# Patient Record
Sex: FEMALE | Race: WHITE | NOT HISPANIC OR LATINO
[De-identification: names, ages, dates, MRNs, and addresses within clinical notes are randomized per-mention and may not be internally consistent; named-entity substitution may affect disease eponyms.]

---

## 2017-01-20 ENCOUNTER — APPOINTMENT (OUTPATIENT)
Dept: UROGYNECOLOGY | Facility: CLINIC | Age: 72
End: 2017-01-20

## 2017-01-20 VITALS
DIASTOLIC BLOOD PRESSURE: 78 MMHG | RESPIRATION RATE: 16 BRPM | SYSTOLIC BLOOD PRESSURE: 148 MMHG | TEMPERATURE: 98.5 F | HEART RATE: 76 BPM

## 2017-01-20 RX ORDER — CEFUROXIME AXETIL 500 MG/1
500 TABLET ORAL
Qty: 20 | Refills: 0 | Status: DISCONTINUED | COMMUNITY
Start: 2016-11-23

## 2017-01-20 RX ORDER — AZITHROMYCIN 250 MG/1
250 TABLET, FILM COATED ORAL
Qty: 6 | Refills: 0 | Status: DISCONTINUED | COMMUNITY
Start: 2017-01-08

## 2017-01-20 RX ORDER — RISEDRONATE SODIUM 150 MG/1
150 TABLET, FILM COATED ORAL
Qty: 1 | Refills: 0 | Status: ACTIVE | COMMUNITY
Start: 2016-10-09

## 2017-01-20 RX ORDER — CANDESARTAN CILEXETIL AND HYDROCHLOROTHIAZIDE 32; 12.5 MG/1; MG/1
32-12.5 TABLET ORAL
Qty: 30 | Refills: 0 | Status: DISCONTINUED | COMMUNITY
Start: 2016-10-20

## 2017-01-30 ENCOUNTER — APPOINTMENT (OUTPATIENT)
Dept: UROGYNECOLOGY | Facility: CLINIC | Age: 72
End: 2017-01-30

## 2017-02-06 ENCOUNTER — APPOINTMENT (OUTPATIENT)
Dept: UROGYNECOLOGY | Facility: CLINIC | Age: 72
End: 2017-02-06

## 2017-02-06 VITALS
SYSTOLIC BLOOD PRESSURE: 130 MMHG | DIASTOLIC BLOOD PRESSURE: 74 MMHG | HEART RATE: 71 BPM | TEMPERATURE: 98 F | RESPIRATION RATE: 16 BRPM

## 2017-02-27 ENCOUNTER — MESSAGE (OUTPATIENT)
Age: 72
End: 2017-02-27

## 2017-03-13 ENCOUNTER — APPOINTMENT (OUTPATIENT)
Dept: UROGYNECOLOGY | Facility: CLINIC | Age: 72
End: 2017-03-13

## 2017-04-19 ENCOUNTER — APPOINTMENT (OUTPATIENT)
Dept: UROGYNECOLOGY | Facility: CLINIC | Age: 72
End: 2017-04-19

## 2017-05-24 ENCOUNTER — APPOINTMENT (OUTPATIENT)
Dept: UROGYNECOLOGY | Facility: CLINIC | Age: 72
End: 2017-05-24

## 2017-05-24 DIAGNOSIS — N93.9 ABNORMAL UTERINE AND VAGINAL BLEEDING, UNSPECIFIED: ICD-10-CM

## 2017-06-28 ENCOUNTER — APPOINTMENT (OUTPATIENT)
Dept: UROGYNECOLOGY | Facility: CLINIC | Age: 72
End: 2017-06-28

## 2017-06-29 ENCOUNTER — MESSAGE (OUTPATIENT)
Age: 72
End: 2017-06-29

## 2017-06-30 ENCOUNTER — APPOINTMENT (OUTPATIENT)
Dept: UROGYNECOLOGY | Facility: CLINIC | Age: 72
End: 2017-06-30

## 2017-09-25 ENCOUNTER — APPOINTMENT (OUTPATIENT)
Dept: UROGYNECOLOGY | Facility: CLINIC | Age: 72
End: 2017-09-25

## 2017-11-24 ENCOUNTER — APPOINTMENT (OUTPATIENT)
Dept: UROGYNECOLOGY | Facility: CLINIC | Age: 72
End: 2017-11-24
Payer: COMMERCIAL

## 2017-11-24 PROCEDURE — 99213 OFFICE O/P EST LOW 20 MIN: CPT

## 2018-03-30 ENCOUNTER — APPOINTMENT (OUTPATIENT)
Dept: UROGYNECOLOGY | Facility: CLINIC | Age: 73
End: 2018-03-30
Payer: MEDICARE

## 2018-03-30 PROCEDURE — 99213 OFFICE O/P EST LOW 20 MIN: CPT

## 2018-08-27 ENCOUNTER — APPOINTMENT (OUTPATIENT)
Dept: UROGYNECOLOGY | Facility: CLINIC | Age: 73
End: 2018-08-27

## 2019-01-25 ENCOUNTER — APPOINTMENT (OUTPATIENT)
Dept: UROGYNECOLOGY | Facility: CLINIC | Age: 74
End: 2019-01-25
Payer: MEDICARE

## 2019-01-25 PROCEDURE — 99213 OFFICE O/P EST LOW 20 MIN: CPT

## 2019-01-25 NOTE — DISCUSSION/SUMMARY
[FreeTextEntry1] : F/U in 3 months for pelvic prolapse.  IF pt have any problems to call office.  PT agrees.

## 2019-01-25 NOTE — PHYSICAL EXAM
[No Acute Distress] : in no acute distress [Well Nourished] : ~L well nourished [Good Hygeine] : demonstrates good hygeine [Oriented x3] : oriented to person, place, and time [Normal Mood/Affect] : mood and affect are normal [Soft, Nontender] : the abdomen was soft and nontender [Warm and Dry] : was warm and dry to touch [Normal Gait] : gait was normal [Vulvar Atrophy] : vulvar atrophy [Atrophy] : atrophy [Cystocele] : a cystocele [Discharge] : a  ~M vaginal discharge was present [Scant] : scant [Santo] : yellow [No Bleeding] : there was no active vaginal bleeding

## 2019-01-25 NOTE — HISTORY OF PRESENT ILLNESS
[FreeTextEntry1] : PT haven't been to the office since last March 2018.  PT doing fine with pessary.  Denies any pain, discomfort, or vaginal bleeding.  Voiding and moving BM without problems.  Stated she had followed up with her GYN Dr. Aragon.  She is due for annual.

## 2019-01-25 NOTE — PROCEDURE
[Good Fit] : fits well [Discharge] : there is vaginal discharge [Pessary Inserted] : inserted [Pessary Washed] : washed [H2O] : H2O [None] : no bleeding [Erythema] : no erythema [Infection] : no evidence of infection [FreeTextEntry1] : Ring with support # 4 [FreeTextEntry8] : Reinforced daily pericare.

## 2020-04-01 ENCOUNTER — APPOINTMENT (OUTPATIENT)
Dept: UROGYNECOLOGY | Facility: CLINIC | Age: 75
End: 2020-04-01

## 2020-06-12 ENCOUNTER — APPOINTMENT (OUTPATIENT)
Dept: UROGYNECOLOGY | Facility: CLINIC | Age: 75
End: 2020-06-12
Payer: MEDICARE

## 2020-06-12 VITALS — TEMPERATURE: 97.8 F

## 2020-06-12 DIAGNOSIS — N39.46 MIXED INCONTINENCE: ICD-10-CM

## 2020-06-12 PROCEDURE — 99213 OFFICE O/P EST LOW 20 MIN: CPT

## 2020-06-15 NOTE — PROCEDURE
[Good Fit] : fits well [Discharge] : there is vaginal discharge [Pessary Inserted] : inserted [Pessary Washed] : washed [H2O] : H2O [None] : no bleeding [Infection] : no evidence of infection [Erythema] : no erythema [FreeTextEntry8] : Reinforced daily pericare. [FreeTextEntry1] : Ring with support # 4

## 2020-06-15 NOTE — HISTORY OF PRESENT ILLNESS
[FreeTextEntry1] : Pt haven't been to the office for 1 year.  She went to Dr. Aragon back in December and was supposed to come to the office April/May but d/t Covid-19, she delayed it.  She's been doing well.\par PT doing fine with pessary.  Denies any pain, discomfort, or vaginal bleeding.  Voiding and moving BM without problems.

## 2021-03-08 ENCOUNTER — APPOINTMENT (OUTPATIENT)
Dept: UROGYNECOLOGY | Facility: CLINIC | Age: 76
End: 2021-03-08
Payer: MEDICARE

## 2021-03-08 PROCEDURE — 99072 ADDL SUPL MATRL&STAF TM PHE: CPT

## 2021-03-08 PROCEDURE — 99213 OFFICE O/P EST LOW 20 MIN: CPT

## 2021-03-08 NOTE — DISCUSSION/SUMMARY
[FreeTextEntry1] : Pelvic Prolapse: \par -Continue with Ring with support # 4.\par -Pt aware of the importance of maintenance and follow ups.\par -Pessary precautions and instructions were reviewed.\par \par F/U in 3 months for pelvic prolapse.  IF pt have any problems to call office.  PT agrees.

## 2021-03-08 NOTE — PHYSICAL EXAM
[No Acute Distress] : in no acute distress [Well Nourished] : ~L well nourished [Good Hygeine] : demonstrates good hygeine [Oriented x3] : oriented to person, place, and time [Normal Mood/Affect] : mood and affect are normal [Soft, Nontender] : the abdomen was soft and nontender [Warm and Dry] : was warm and dry to touch [Normal Gait] : gait was normal [Vulvar Atrophy] : vulvar atrophy [Atrophy] : atrophy [Cystocele] : a cystocele [Discharge] : a  ~M vaginal discharge was present [Scant] : scant [Santo] : yellow [No Bleeding] : there was no active vaginal bleeding [de-identified] : Mild friable introitus.  Small bleed but stops with pressure.

## 2021-03-08 NOTE — HISTORY OF PRESENT ILLNESS
[FreeTextEntry1] : Pt haven't been to the office for 9 months.\par PT doing fine with pessary.  Denies any pain, discomfort, or vaginal bleeding.  Voiding and moving BM without problems.\par Pt have been caring for family and didn't have the time come to the office.  She is aware of the importance to come for maintenance.

## 2021-06-29 ENCOUNTER — APPOINTMENT (OUTPATIENT)
Dept: GASTROENTEROLOGY | Facility: CLINIC | Age: 76
End: 2021-06-29

## 2021-07-12 ENCOUNTER — APPOINTMENT (OUTPATIENT)
Dept: GASTROENTEROLOGY | Facility: CLINIC | Age: 76
End: 2021-07-12
Payer: MEDICARE

## 2021-07-12 VITALS
OXYGEN SATURATION: 98 % | HEIGHT: 62 IN | HEART RATE: 72 BPM | WEIGHT: 122.13 LBS | DIASTOLIC BLOOD PRESSURE: 60 MMHG | TEMPERATURE: 97.5 F | BODY MASS INDEX: 22.48 KG/M2 | SYSTOLIC BLOOD PRESSURE: 108 MMHG

## 2021-07-12 DIAGNOSIS — Z86.010 PERSONAL HISTORY OF COLONIC POLYPS: ICD-10-CM

## 2021-07-12 DIAGNOSIS — Z78.9 OTHER SPECIFIED HEALTH STATUS: ICD-10-CM

## 2021-07-12 DIAGNOSIS — K21.9 GASTRO-ESOPHAGEAL REFLUX DISEASE W/OUT ESOPHAGITIS: ICD-10-CM

## 2021-07-12 PROCEDURE — 99072 ADDL SUPL MATRL&STAF TM PHE: CPT

## 2021-07-12 PROCEDURE — 99213 OFFICE O/P EST LOW 20 MIN: CPT

## 2021-07-12 NOTE — ASSESSMENT
[FreeTextEntry1] : Impression: History of colon polyps due for follow-up surveillance colonoscopy.  We will schedule using MiraLAX prep.  Long history of GERD we will continue pantoprazole 20 mg at least q. OD with extra doses as needed

## 2021-07-12 NOTE — HISTORY OF PRESENT ILLNESS
[Heartburn] : stable heartburn [Nausea] : stable nausea [Vomiting] : denies vomiting [Diarrhea] : denies diarrhea [Constipation] : denies constipation [Yellow Skin Or Eyes (Jaundice)] : denies jaundice [Abdominal Pain] : denies abdominal pain [Abdominal Swelling] : denies abdominal swelling [Rectal Pain] : denies rectal pain [GERD] : gastroesophageal reflux disease [de-identified] : 75-year-old female history of colon polyps overdue for follow-up surveillance colonoscopy.  Last exam 7 years ago.  No complaints.  Had been taking pantoprazole 40 mg daily for GERD symptoms with good control.  She requested a dosage reduction from her PMD to 20 mg and has been trying to use that as little as possible.  As a result she has some nausea off and on which responds to taking the medication.  Has never had an EGD due to concerns about locking/dislocation of jaw.\par \par States she has mitral valve prolapse but no coronary artery disease based on noninvasive work-up in the recent past (Dr. Isaac Telles)\par \par

## 2021-07-14 RX ORDER — SODIUM PICOSULFATE, MAGNESIUM OXIDE, AND ANHYDROUS CITRIC ACID 10; 3.5; 12 MG/160ML; G/160ML; G/160ML
10-3.5-12 MG-GM LIQUID ORAL
Qty: 1 | Refills: 0 | Status: COMPLETED | COMMUNITY
Start: 2021-07-14 | End: 2021-07-16

## 2021-07-23 ENCOUNTER — APPOINTMENT (OUTPATIENT)
Dept: GASTROENTEROLOGY | Facility: AMBULATORY MEDICAL SERVICES | Age: 76
End: 2021-07-23
Payer: MEDICARE

## 2021-07-23 PROCEDURE — 45378 DIAGNOSTIC COLONOSCOPY: CPT

## 2021-12-13 ENCOUNTER — APPOINTMENT (OUTPATIENT)
Dept: UROGYNECOLOGY | Facility: CLINIC | Age: 76
End: 2021-12-13
Payer: MEDICARE

## 2021-12-13 PROCEDURE — 99213 OFFICE O/P EST LOW 20 MIN: CPT

## 2021-12-13 NOTE — HISTORY OF PRESENT ILLNESS
[FreeTextEntry1] : Pt haven't been to the office for 9 months.\par Belinda, 75y/o doing fine with pessary.  Supported with Ring with support # 4. Denies any pain, discomfort, or vaginal bleeding.  Voiding and moving BM without problems.  She is using vaginal lubrication when she remembers.\par Pt have been caring for family and didn't have the time come to the office.  Again, she is aware of the importance to come for maintenance.

## 2021-12-13 NOTE — PHYSICAL EXAM
[No Acute Distress] : in no acute distress [Well Nourished] : ~L well nourished [Good Hygeine] : demonstrates good hygeine [Oriented x3] : oriented to person, place, and time [Normal Mood/Affect] : mood and affect are normal [Soft, Nontender] : the abdomen was soft and nontender [Warm and Dry] : was warm and dry to touch [Normal Gait] : gait was normal [Vulvar Atrophy] : vulvar atrophy [Atrophy] : atrophy [Cystocele] : a cystocele [Discharge] : a  ~M vaginal discharge was present [Scant] : scant [Santo] : yellow [No Bleeding] : there was no active vaginal bleeding [de-identified] : Mild friable introitus.  Small bleed but stops with pressure.

## 2022-03-14 ENCOUNTER — APPOINTMENT (OUTPATIENT)
Dept: UROGYNECOLOGY | Facility: CLINIC | Age: 77
End: 2022-03-14

## 2022-04-11 ENCOUNTER — APPOINTMENT (OUTPATIENT)
Dept: UROGYNECOLOGY | Facility: CLINIC | Age: 77
End: 2022-04-11
Payer: MEDICARE

## 2022-04-11 DIAGNOSIS — R31.29 OTHER MICROSCOPIC HEMATURIA: ICD-10-CM

## 2022-04-11 DIAGNOSIS — N89.8 OTHER SPECIFIED NONINFLAMMATORY DISORDERS OF VAGINA: ICD-10-CM

## 2022-04-11 LAB
BILIRUB UR QL STRIP: NORMAL
CLARITY UR: CLEAR
COLLECTION METHOD: NORMAL
GLUCOSE UR-MCNC: NORMAL
HCG UR QL: 0.2 EU/DL
HGB UR QL STRIP.AUTO: NORMAL
KETONES UR-MCNC: NORMAL
LEUKOCYTE ESTERASE UR QL STRIP: NORMAL
NITRITE UR QL STRIP: NORMAL
PH UR STRIP: 5.5
PROT UR STRIP-MCNC: NORMAL
SP GR UR STRIP: 1.02

## 2022-04-11 PROCEDURE — 99214 OFFICE O/P EST MOD 30 MIN: CPT

## 2022-04-11 PROCEDURE — 81003 URINALYSIS AUTO W/O SCOPE: CPT | Mod: QW

## 2022-04-11 RX ORDER — ROSUVASTATIN CALCIUM 5 MG/1
5 TABLET, FILM COATED ORAL
Qty: 90 | Refills: 0 | Status: ACTIVE | COMMUNITY
Start: 2021-12-17

## 2022-04-13 NOTE — PROCEDURE
[Straight Catheterization] : insertion of a straight catheter [Hematuria] : hematuria [Patient] : the patient [Clear] : clear [No Complications] : no complications [Tolerated Well] : the patient tolerated the procedure well [Post procedure instructions and information given] : Post procedure instructions and information were given and reviewed with patient. [Good Fit] : fits well [Discharge] : there is vaginal discharge [Pessary Inserted] : inserted [Pessary Washed] : washed [H2O] : H2O [None] : no bleeding [Erythema] : no erythema [Infection] : no evidence of infection [FreeTextEntry1] : Ring with support # 4 [FreeTextEntry8] : Reinforced daily pericare.

## 2022-04-13 NOTE — HISTORY OF PRESENT ILLNESS
[FreeTextEntry1] : \uri Belinda, 75y/o doing fine with pessary.  Supported with Ring with support # 4. Denies any pain, discomfort, or vaginal bleeding.  Voiding and moving BM without problems.  Using Replens 3x a week.\uri PT had followed up with GYN and had urine done and said she had blood in the urine.  She was asked to follow up with our office regarding this.  The urine she had done at her GYN was clean catch.  She is aware we will perform cath urine.  Denies any fever, chills, back pain, blood in urine, urinary frequency, urgency, or dysuria.

## 2022-04-13 NOTE — DISCUSSION/SUMMARY
[FreeTextEntry1] : Pelvic Prolapse: \par -Continue with Ring with support # 4.\par -Pt aware of the importance of maintenance and follow ups.\par -Pessary precautions and instructions were reviewed.\par \par Vaginal atrophy:\par -PT using Replens 3x a week and managing well.\par \par Microhematuria:\par -Negative urine dip.  \par \par F/U in 3 months for pelvic prolapse.  IF pt have any problems to call office.  PT agrees.

## 2022-08-08 ENCOUNTER — APPOINTMENT (OUTPATIENT)
Dept: UROGYNECOLOGY | Facility: CLINIC | Age: 77
End: 2022-08-08

## 2022-08-08 PROCEDURE — 99213 OFFICE O/P EST LOW 20 MIN: CPT

## 2022-08-08 NOTE — PROCEDURE
[Good Fit] : fits well [Discharge] : there is vaginal discharge [Pessary Inserted] : inserted [Pessary Washed] : washed [H2O] : H2O [None] : no bleeding [Medication Review] : Medicaiton Review: Patient verbalizes understanding of risks and benefits [Refit] : refit is not needed [Erosion] : no evidence of erosion [Erythema] : no erythema [Infection] : no evidence of infection [FreeTextEntry1] : Ring with support # 4 [de-identified] : scant yellow discharge [FreeTextEntry3] : Replens [FreeTextEntry8] : Reinforced daily pericare.

## 2022-08-08 NOTE — HISTORY OF PRESENT ILLNESS
[FreeTextEntry1] : \par Belinda, 77y/o presents for follow up for pelvic prolapse. Prolapse supported with Ring with support # 4. Denies any pain, discomfort, or vaginal bleeding.  Voiding and moving BM without problems.  Using Replens 3x a week.\par

## 2022-08-08 NOTE — PHYSICAL EXAM
[No Acute Distress] : in no acute distress [Well developed] : well developed [Well Nourished] : ~L well nourished [Good Hygeine] : demonstrates good hygeine [Oriented x3] : oriented to person, place, and time [Normal Memory] : ~T memory was ~L unimpaired [Normal Mood/Affect] : mood and affect are normal [Soft, Nontender] : the abdomen was soft and nontender [Warm and Dry] : was warm and dry to touch [Normal Gait] : gait was normal [Normal] : normal external genitalia [Vulvar Atrophy] : vulvar atrophy [Labia Majora] : were normal [Labia Minora] : were normal [Atrophy] : atrophy [Cystocele] : a cystocele [Discharge] : a  ~M vaginal discharge was present [Scant] : scant [Santo] : yellow [No Bleeding] : there was no active vaginal bleeding [Anxiety] : patient is not anxious

## 2023-03-20 ENCOUNTER — APPOINTMENT (OUTPATIENT)
Dept: UROGYNECOLOGY | Facility: CLINIC | Age: 78
End: 2023-03-20
Payer: MEDICARE

## 2023-03-20 DIAGNOSIS — N95.2 POSTMENOPAUSAL ATROPHIC VAGINITIS: ICD-10-CM

## 2023-03-20 PROCEDURE — 99213 OFFICE O/P EST LOW 20 MIN: CPT

## 2023-03-21 NOTE — DISCUSSION/SUMMARY
[FreeTextEntry1] : Pelvic Prolapse: \par -Continue with Ring with support # 4.\par -Pessary precautions and instructions were reviewed.\par \par Vaginal atrophy:\par -Patient using Replens 3x a week and managing well.\par \par F/U in 3 months for pelvic prolapse. \par Advised p to call office with any questions or concerns.

## 2023-03-21 NOTE — HISTORY OF PRESENT ILLNESS
[FreeTextEntry1] : \par Belinda, 78 y/o presents for follow up for pelvic prolapse. Prolapse supported with Ring with support # 4. Denies any pain, discomfort, or vaginal bleeding.  Voiding and moving her bowels without issues.  Using Replens 3x a week.\par

## 2023-03-21 NOTE — PROCEDURE
[Good Fit] : fits well [Erythema] : erythema noted [Discharge] : there is vaginal discharge [Pessary Inserted] : inserted [Pessary Washed] : washed [H2O] : H2O [None] : no bleeding [Medication Review] : Medicaiton Review: Patient verbalizes understanding of risks and benefits [Refit] : refit is not needed [Erosion] : no evidence of erosion [Infection] : no evidence of infection [FreeTextEntry1] : Ring with support # 4 [de-identified] : posterior vaginal wall [de-identified] : scant yellow discharge [FreeTextEntry3] : Replens [FreeTextEntry8] : Reinforced daily pericare.

## 2023-06-26 ENCOUNTER — APPOINTMENT (OUTPATIENT)
Dept: UROGYNECOLOGY | Facility: CLINIC | Age: 78
End: 2023-06-26

## 2023-11-20 ENCOUNTER — OFFICE (OUTPATIENT)
Dept: URBAN - METROPOLITAN AREA CLINIC 35 | Facility: CLINIC | Age: 78
Setting detail: OPHTHALMOLOGY
End: 2023-11-20
Payer: MEDICARE

## 2023-11-20 DIAGNOSIS — H52.4: ICD-10-CM

## 2023-11-20 DIAGNOSIS — H02.34: ICD-10-CM

## 2023-11-20 DIAGNOSIS — H02.31: ICD-10-CM

## 2023-11-20 DIAGNOSIS — H10.45: ICD-10-CM

## 2023-11-20 DIAGNOSIS — H52.7: ICD-10-CM

## 2023-11-20 DIAGNOSIS — H40.013: ICD-10-CM

## 2023-11-20 DIAGNOSIS — H25.13: ICD-10-CM

## 2023-11-20 DIAGNOSIS — H11.042: ICD-10-CM

## 2023-11-20 DIAGNOSIS — H16.9: ICD-10-CM

## 2023-11-20 PROCEDURE — 92004 COMPRE OPH EXAM NEW PT 1/>: CPT | Performed by: OPHTHALMOLOGY

## 2023-11-20 PROCEDURE — 92133 CPTRZD OPH DX IMG PST SGM ON: CPT | Performed by: OPHTHALMOLOGY

## 2023-11-20 PROCEDURE — 92015 DETERMINE REFRACTIVE STATE: CPT | Performed by: OPHTHALMOLOGY

## 2023-11-20 ASSESSMENT — SPHEQUIV_DERIVED
OS_SPHEQUIV: 1
OD_SPHEQUIV: 1.125
OS_SPHEQUIV: 0.25
OD_SPHEQUIV: 0.75
OD_SPHEQUIV: 1.375
OS_SPHEQUIV: 0.375

## 2023-11-20 ASSESSMENT — REFRACTION_MANIFEST
OD_AXIS: 015
OS_CYLINDER: +1.25
OS_VA1: 20/20
OS_ADD: +2.50
OD_AXIS: 025
OS_CYLINDER: +1.00
OS_VA1: 20/25
OS_ADD: +2.75
OD_VA1: 20/25
OD_SPHERE: +0.50
OD_ADD: +2.50
OS_AXIS: 180
OD_VA1: 20/25
OD_SPHERE: +1.00
OD_CYLINDER: +0.50
OD_ADD: +2.75
OS_AXIS: 180
OS_SPHERE: -0.25
OD_CYLINDER: +0.75
OS_SPHERE: -0.25

## 2023-11-20 ASSESSMENT — CONFRONTATIONAL VISUAL FIELD TEST (CVF)
OD_FINDINGS: FULL
OS_FINDINGS: FULL

## 2023-11-20 ASSESSMENT — REFRACTION_CURRENTRX
OS_VPRISM_DIRECTION: SV
OD_SPHERE: +2.50
OD_VPRISM_DIRECTION: SV
OD_OVR_VA: 20/
OS_SPHERE: +2.50
OS_OVR_VA: 20/

## 2023-11-20 ASSESSMENT — REFRACTION_AUTOREFRACTION
OD_CYLINDER: +0.75
OD_AXIS: 023
OS_SPHERE: +0.25
OS_CYLINDER: +1.50
OS_AXIS: 180
OD_SPHERE: +0.75

## 2023-11-20 ASSESSMENT — LID POSITION - COMMENTS
OD_COMMENTS: BLEPHAROCHALASIS
OS_COMMENTS: BLEPHAROCHALASIS

## 2023-11-20 ASSESSMENT — CORNEAL PTERYGIUM: OS_PTERYGIUM: NASAL 2MM

## 2023-12-04 ENCOUNTER — OFFICE (OUTPATIENT)
Dept: URBAN - METROPOLITAN AREA CLINIC 35 | Facility: CLINIC | Age: 78
Setting detail: OPHTHALMOLOGY
End: 2023-12-04
Payer: MEDICARE

## 2023-12-04 DIAGNOSIS — H16.9: ICD-10-CM

## 2023-12-04 DIAGNOSIS — H10.45: ICD-10-CM

## 2023-12-04 PROCEDURE — 99213 OFFICE O/P EST LOW 20 MIN: CPT | Performed by: OPHTHALMOLOGY

## 2023-12-04 ASSESSMENT — CORNEAL PTERYGIUM: OS_PTERYGIUM: NASAL 2MM

## 2023-12-04 ASSESSMENT — REFRACTION_CURRENTRX
OD_SPHERE: +2.50
OS_OVR_VA: 20/
OD_OVR_VA: 20/
OS_VPRISM_DIRECTION: SV
OS_SPHERE: +2.50
OD_VPRISM_DIRECTION: SV

## 2023-12-04 ASSESSMENT — REFRACTION_MANIFEST
OS_CYLINDER: +1.25
OS_CYLINDER: +1.00
OD_CYLINDER: +0.50
OD_ADD: +2.50
OD_VA1: 20/25
OD_SPHERE: +0.50
OS_AXIS: 180
OS_ADD: +2.75
OD_SPHERE: +1.00
OD_CYLINDER: +0.75
OS_SPHERE: -0.25
OD_ADD: +2.75
OS_VA1: 20/20
OS_VA1: 20/25
OS_SPHERE: -0.25
OS_ADD: +2.50
OS_AXIS: 180
OD_VA1: 20/25
OD_AXIS: 015
OD_AXIS: 025

## 2023-12-04 ASSESSMENT — SPHEQUIV_DERIVED
OS_SPHEQUIV: 0.25
OD_SPHEQUIV: 1.375
OS_SPHEQUIV: 0.375
OS_SPHEQUIV: 0
OD_SPHEQUIV: 0.75
OD_SPHEQUIV: 1.25

## 2023-12-04 ASSESSMENT — LID POSITION - COMMENTS
OD_COMMENTS: BLEPHAROCHALASIS
OS_COMMENTS: BLEPHAROCHALASIS

## 2023-12-04 ASSESSMENT — REFRACTION_AUTOREFRACTION
OD_AXIS: 034
OS_AXIS: 180
OD_SPHERE: +1.00
OS_SPHERE: -0.25
OD_CYLINDER: +0.50
OS_CYLINDER: +0.50

## 2024-03-25 ENCOUNTER — APPOINTMENT (OUTPATIENT)
Dept: UROGYNECOLOGY | Facility: CLINIC | Age: 79
End: 2024-03-25
Payer: MEDICARE

## 2024-03-25 PROCEDURE — 99213 OFFICE O/P EST LOW 20 MIN: CPT

## 2024-03-28 NOTE — PROCEDURE
[Good Fit] : fits well [Erythema] : erythema noted [Discharge] : there is vaginal discharge [Pessary Inserted] : inserted [Pessary Washed] : washed [H2O] : H2O [None] : no bleeding [Medication Review] : Medicaiton Review: Patient verbalizes understanding of risks and benefits [Refit] : refit is not needed [Infection] : no evidence of infection [Erosion] : no evidence of erosion [FreeTextEntry1] : Ring with support # 4 [de-identified] : scant yellow discharge [de-identified] : posterior vaginal wall [FreeTextEntry3] : Replens [FreeTextEntry8] : Reinforced daily pericare.

## 2024-03-28 NOTE — PHYSICAL EXAM
[No Bleeding] : there was no active vaginal bleeding [No Acute Distress] : in no acute distress [Well developed] : well developed [Well Nourished] : ~L well nourished [Good Hygeine] : demonstrates good hygeine [Oriented x3] : oriented to person, place, and time [Normal Memory] : ~T memory was ~L unimpaired [Normal Mood/Affect] : mood and affect are normal [Soft, Nontender] : the abdomen was soft and nontender [Warm and Dry] : was warm and dry to touch [Normal Gait] : gait was normal [Vulvar Atrophy] : vulvar atrophy [Normal] : normal external genitalia [Labia Majora] : were normal [Labia Minora] : were normal [Erythematous] : erythema [Atrophy] : atrophy [Cystocele] : a cystocele [Discharge] : a  ~M vaginal discharge was present [Santo] : yellow [Scant] : there was scant vaginal bleeding [Anxiety] : patient is not anxious

## 2024-03-28 NOTE — DISCUSSION/SUMMARY
[FreeTextEntry1] : Pelvic Prolapse:  -Continue with Ring with support # 4. -Pessary precautions and instructions were reviewed.  Vaginal atrophy: -Patient using Replens 3x a week and managing well.  Appointment adherence discussed; she was advised of need to f/u every 3 months for pelvic prolapse.  Advised p to call office with any questions or concerns.

## 2024-03-28 NOTE — PROCEDURE
[Good Fit] : fits well [Erythema] : erythema noted [Discharge] : there is vaginal discharge [Pessary Inserted] : inserted [Pessary Washed] : washed [None] : no bleeding [H2O] : H2O [Medication Review] : Medicaiton Review: Patient verbalizes understanding of risks and benefits [Refit] : refit is not needed [Infection] : no evidence of infection [Erosion] : no evidence of erosion [FreeTextEntry1] : Ring with support # 4 [de-identified] : scant yellow discharge [de-identified] : posterior vaginal wall [FreeTextEntry3] : Replens [FreeTextEntry8] : Reinforced daily pericare.

## 2024-03-28 NOTE — HISTORY OF PRESENT ILLNESS
[FreeTextEntry1] : Belinda is a 79 y/o presents for follow up for pelvic prolapse. Prolapse supported with Ring with support # 4.  She was last seen in the office in 3/2023, reports that she followed up with her GYN 9/2023.  She denies pain, discomfort, or vaginal bleeding.  Voiding and moving her bowels without issues.  Using Replens 3x a week.

## 2024-03-28 NOTE — PHYSICAL EXAM
[No Bleeding] : there was no active vaginal bleeding [No Acute Distress] : in no acute distress [Well developed] : well developed [Good Hygeine] : demonstrates good hygeine [Well Nourished] : ~L well nourished [Oriented x3] : oriented to person, place, and time [Normal Memory] : ~T memory was ~L unimpaired [Normal Mood/Affect] : mood and affect are normal [Soft, Nontender] : the abdomen was soft and nontender [Warm and Dry] : was warm and dry to touch [Normal Gait] : gait was normal [Vulvar Atrophy] : vulvar atrophy [Normal] : normal external genitalia [Labia Minora] : were normal [Labia Majora] : were normal [Erythematous] : erythema [Atrophy] : atrophy [Cystocele] : a cystocele [Discharge] : a  ~M vaginal discharge was present [Santo] : yellow [Scant] : there was scant vaginal bleeding [Anxiety] : patient is not anxious

## 2024-07-01 ENCOUNTER — APPOINTMENT (OUTPATIENT)
Dept: UROGYNECOLOGY | Facility: CLINIC | Age: 79
End: 2024-07-01
Payer: MEDICARE

## 2024-07-01 DIAGNOSIS — N81.4 UTEROVAGINAL PROLAPSE, UNSPECIFIED: ICD-10-CM

## 2024-07-01 PROCEDURE — G2211 COMPLEX E/M VISIT ADD ON: CPT

## 2024-07-01 PROCEDURE — 99213 OFFICE O/P EST LOW 20 MIN: CPT

## 2024-10-07 ENCOUNTER — APPOINTMENT (OUTPATIENT)
Dept: UROGYNECOLOGY | Facility: CLINIC | Age: 79
End: 2024-10-07
Payer: MEDICARE

## 2024-10-07 DIAGNOSIS — N81.4 UTEROVAGINAL PROLAPSE, UNSPECIFIED: ICD-10-CM

## 2024-10-07 PROCEDURE — 99213 OFFICE O/P EST LOW 20 MIN: CPT

## 2025-01-06 ENCOUNTER — APPOINTMENT (OUTPATIENT)
Dept: UROGYNECOLOGY | Facility: CLINIC | Age: 80
End: 2025-01-06
Payer: MEDICARE

## 2025-01-06 DIAGNOSIS — N81.4 UTEROVAGINAL PROLAPSE, UNSPECIFIED: ICD-10-CM

## 2025-01-06 PROCEDURE — 99213 OFFICE O/P EST LOW 20 MIN: CPT

## 2025-01-06 PROCEDURE — G2211 COMPLEX E/M VISIT ADD ON: CPT

## 2025-02-18 ENCOUNTER — APPOINTMENT (OUTPATIENT)
Dept: GASTROENTEROLOGY | Facility: CLINIC | Age: 80
End: 2025-02-18
Payer: MEDICARE

## 2025-02-18 VITALS
OXYGEN SATURATION: 97 % | DIASTOLIC BLOOD PRESSURE: 70 MMHG | WEIGHT: 120 LBS | TEMPERATURE: 97.1 F | HEIGHT: 62 IN | SYSTOLIC BLOOD PRESSURE: 110 MMHG | BODY MASS INDEX: 22.08 KG/M2 | HEART RATE: 70 BPM

## 2025-02-18 DIAGNOSIS — R73.03 PREDIABETES.: ICD-10-CM

## 2025-02-18 DIAGNOSIS — R15.0 INCOMPLETE DEFECATION: ICD-10-CM

## 2025-02-18 DIAGNOSIS — K59.00 CONSTIPATION, UNSPECIFIED: ICD-10-CM

## 2025-02-18 DIAGNOSIS — Z86.0101 PERSONAL HISTORY OF ADENOMATOUS AND SERRATED COLON POLYPS: ICD-10-CM

## 2025-02-18 DIAGNOSIS — K21.9 GASTRO-ESOPHAGEAL REFLUX DISEASE W/OUT ESOPHAGITIS: ICD-10-CM

## 2025-02-18 PROCEDURE — 99204 OFFICE O/P NEW MOD 45 MIN: CPT

## 2025-02-18 RX ORDER — PANTOPRAZOLE 40 MG/1
40 TABLET, DELAYED RELEASE ORAL
Qty: 30 | Refills: 5 | Status: ACTIVE | COMMUNITY
Start: 2025-02-18 | End: 1900-01-01

## 2025-03-31 ENCOUNTER — APPOINTMENT (OUTPATIENT)
Dept: UROGYNECOLOGY | Facility: CLINIC | Age: 80
End: 2025-03-31

## 2025-04-01 ENCOUNTER — RX ONLY (RX ONLY)
Age: 80
End: 2025-04-01

## 2025-04-01 ENCOUNTER — DOCTOR'S OFFICE (OUTPATIENT)
Facility: LOCATION | Age: 80
Setting detail: OPHTHALMOLOGY
End: 2025-04-01
Payer: MEDICARE

## 2025-04-01 DIAGNOSIS — H16.422: ICD-10-CM

## 2025-04-01 DIAGNOSIS — H25.13: ICD-10-CM

## 2025-04-01 DIAGNOSIS — H11.042: ICD-10-CM

## 2025-04-01 DIAGNOSIS — H52.7: ICD-10-CM

## 2025-04-01 DIAGNOSIS — H11.153: ICD-10-CM

## 2025-04-01 DIAGNOSIS — H40.013: ICD-10-CM

## 2025-04-01 DIAGNOSIS — H52.4: ICD-10-CM

## 2025-04-01 PROCEDURE — 92015 DETERMINE REFRACTIVE STATE: CPT | Performed by: OPHTHALMOLOGY

## 2025-04-01 PROCEDURE — 92014 COMPRE OPH EXAM EST PT 1/>: CPT | Performed by: OPHTHALMOLOGY

## 2025-04-01 ASSESSMENT — REFRACTION_MANIFEST
OD_CYLINDER: +0.75
OS_SPHERE: -0.25
OD_ADD: +2.75
OS_AXIS: 180
OS_CYLINDER: +1.25
OD_VA1: 20/25
OD_VA1: 20/25
OS_CYLINDER: +0.75
OD_AXIS: 030
OD_SPHERE: +0.50
OS_VA1: 20/20
OS_ADD: +2.50
OS_SPHERE: PLANO
OS_AXIS: 180
OD_CYLINDER: +0.50
OS_VA1: 20/25-
OD_SPHERE: +0.50
OD_ADD: +2.50
OD_AXIS: 025
OS_ADD: +2.75

## 2025-04-01 ASSESSMENT — CORNEAL PTERYGIUM: OS_PTERYGIUM: NASAL 2MM

## 2025-04-01 ASSESSMENT — REFRACTION_CURRENTRX
OD_VPRISM_DIRECTION: SV
OS_OVR_VA: 20/
OD_OVR_VA: 20/
OS_SPHERE: +2.50
OS_VPRISM_DIRECTION: SV
OD_SPHERE: +2.50

## 2025-04-01 ASSESSMENT — CONFRONTATIONAL VISUAL FIELD TEST (CVF)
OS_FINDINGS: FULL
OD_FINDINGS: FULL

## 2025-04-01 ASSESSMENT — KERATOMETRY
OD_K1POWER_DIOPTERS: 43.50
METHOD_AUTO_MANUAL: AUTO
OS_K2POWER_DIOPTERS: 45.00
OS_AXISANGLE_DEGREES: 087
OD_AXISANGLE_DEGREES: 081
OD_K2POWER_DIOPTERS: 44.25
OS_K1POWER_DIOPTERS: 43.75

## 2025-04-01 ASSESSMENT — TONOMETRY
OD_IOP_MMHG: 16
OS_IOP_MMHG: 16

## 2025-04-01 ASSESSMENT — VISUAL ACUITY
OD_BCVA: 20/40-
OS_BCVA: 20/30-2

## 2025-04-01 ASSESSMENT — REFRACTION_AUTOREFRACTION
OD_AXIS: 018
OS_CYLINDER: +1.50
OD_SPHERE: +0.75
OS_AXIS: 178
OS_SPHERE: PLANO
OD_CYLINDER: +0.75

## 2025-04-01 ASSESSMENT — VASCULARIZATION: OS_VASCULARIZATION: NASAL PANNUS

## 2025-04-01 ASSESSMENT — PACHYMETRY
OD_CT_CORRECTION: 1
OS_CT_CORRECTION: 5
OD_CT_UM: 539
OS_CT_UM: 473

## 2025-04-01 ASSESSMENT — LID POSITION - COMMENTS
OS_COMMENTS: BLEPHAROCHALASIS
OD_COMMENTS: BLEPHAROCHALASIS

## 2025-04-07 ENCOUNTER — APPOINTMENT (OUTPATIENT)
Dept: GASTROENTEROLOGY | Facility: CLINIC | Age: 80
End: 2025-04-07

## 2025-05-12 ENCOUNTER — APPOINTMENT (OUTPATIENT)
Dept: UROGYNECOLOGY | Facility: CLINIC | Age: 80
End: 2025-05-12
Payer: MEDICARE

## 2025-05-12 PROCEDURE — G2211 COMPLEX E/M VISIT ADD ON: CPT

## 2025-05-12 PROCEDURE — 99213 OFFICE O/P EST LOW 20 MIN: CPT

## 2025-05-19 ENCOUNTER — APPOINTMENT (OUTPATIENT)
Dept: GASTROENTEROLOGY | Facility: CLINIC | Age: 80
End: 2025-05-19
Payer: MEDICARE

## 2025-05-19 VITALS
DIASTOLIC BLOOD PRESSURE: 70 MMHG | WEIGHT: 114 LBS | HEART RATE: 84 BPM | TEMPERATURE: 97.3 F | HEIGHT: 62 IN | BODY MASS INDEX: 20.98 KG/M2 | SYSTOLIC BLOOD PRESSURE: 120 MMHG | OXYGEN SATURATION: 98 %

## 2025-05-19 DIAGNOSIS — R10.13 EPIGASTRIC PAIN: ICD-10-CM

## 2025-05-19 DIAGNOSIS — K21.9 GASTRO-ESOPHAGEAL REFLUX DISEASE W/OUT ESOPHAGITIS: ICD-10-CM

## 2025-05-19 DIAGNOSIS — K57.32 DIVERTICULITIS OF LARGE INTESTINE W/OUT PERFORATION OR ABSCESS W/OUT BLEEDING: ICD-10-CM

## 2025-05-19 DIAGNOSIS — Z86.0101 PERSONAL HISTORY OF ADENOMATOUS AND SERRATED COLON POLYPS: ICD-10-CM

## 2025-05-19 PROCEDURE — 99214 OFFICE O/P EST MOD 30 MIN: CPT

## 2025-07-07 ENCOUNTER — APPOINTMENT (OUTPATIENT)
Dept: GASTROENTEROLOGY | Facility: CLINIC | Age: 80
End: 2025-07-07
Payer: MEDICARE

## 2025-07-07 VITALS — BODY MASS INDEX: 21.03 KG/M2 | WEIGHT: 115 LBS

## 2025-07-07 PROCEDURE — 97802 MEDICAL NUTRITION INDIV IN: CPT
